# Patient Record
Sex: FEMALE | NOT HISPANIC OR LATINO | Employment: FULL TIME | URBAN - METROPOLITAN AREA
[De-identification: names, ages, dates, MRNs, and addresses within clinical notes are randomized per-mention and may not be internally consistent; named-entity substitution may affect disease eponyms.]

---

## 2023-11-06 DIAGNOSIS — G43.009 MIGRAINE WITHOUT AURA AND WITHOUT STATUS MIGRAINOSUS, NOT INTRACTABLE: ICD-10-CM

## 2023-11-06 PROBLEM — M79.7 FIBROMYALGIA: Status: ACTIVE | Noted: 2023-11-06

## 2023-11-06 PROBLEM — G43.909 MIGRAINES: Status: ACTIVE | Noted: 2023-11-06

## 2023-11-06 PROBLEM — R25.2 MUSCLE CRAMPS: Status: ACTIVE | Noted: 2023-11-06

## 2023-11-06 PROBLEM — E78.5 HYPERLIPIDEMIA: Status: ACTIVE | Noted: 2023-11-06

## 2023-11-06 PROBLEM — F98.8 ADD (ATTENTION DEFICIT DISORDER): Status: ACTIVE | Noted: 2023-11-06

## 2023-11-06 PROBLEM — R53.83 FATIGUE: Status: ACTIVE | Noted: 2023-11-06

## 2023-11-06 PROBLEM — M25.50 ARTHRALGIA: Status: ACTIVE | Noted: 2023-11-06

## 2023-11-06 PROBLEM — E55.9 VITAMIN D DEFICIENCY: Status: ACTIVE | Noted: 2023-11-06

## 2023-11-06 PROBLEM — R76.8 ANA POSITIVE: Status: ACTIVE | Noted: 2023-11-06

## 2023-11-06 PROBLEM — K62.89 RECTAL PAIN: Status: ACTIVE | Noted: 2023-11-06

## 2023-11-06 PROBLEM — R55 SYNCOPE: Status: ACTIVE | Noted: 2023-11-06

## 2023-11-06 RX ORDER — UBROGEPANT 100 MG/1
100 TABLET ORAL AS NEEDED
COMMUNITY
Start: 2022-11-04 | End: 2023-11-06 | Stop reason: SDUPTHER

## 2023-11-06 RX ORDER — BIOTIN 1 MG
1000 TABLET ORAL DAILY
COMMUNITY

## 2023-11-06 RX ORDER — UBROGEPANT 100 MG/1
TABLET ORAL
Qty: 10 TABLET | Refills: 11 | Status: SHIPPED | OUTPATIENT
Start: 2023-11-06

## 2023-11-06 RX ORDER — CHOLECALCIFEROL (VITAMIN D3) 25 MCG
1000 TABLET ORAL EVERY OTHER DAY
COMMUNITY

## 2023-11-06 RX ORDER — SUMATRIPTAN SUCCINATE 100 MG/1
100 TABLET, FILM COATED ORAL
COMMUNITY
Start: 2020-10-20 | End: 2023-11-28 | Stop reason: SDUPTHER

## 2023-11-28 DIAGNOSIS — G43.809 OTHER MIGRAINE WITHOUT STATUS MIGRAINOSUS, NOT INTRACTABLE: ICD-10-CM

## 2023-11-28 RX ORDER — SUMATRIPTAN SUCCINATE 100 MG/1
100 TABLET ORAL ONCE AS NEEDED
Qty: 30 TABLET | Refills: 3 | Status: SHIPPED | OUTPATIENT
Start: 2023-11-28 | End: 2023-11-28 | Stop reason: SDUPTHER

## 2023-11-28 RX ORDER — SUMATRIPTAN SUCCINATE 100 MG/1
TABLET ORAL
Qty: 30 TABLET | Refills: 3 | Status: SHIPPED | OUTPATIENT
Start: 2023-11-28

## 2024-02-13 ENCOUNTER — OFFICE VISIT (OUTPATIENT)
Dept: VASCULAR SURGERY | Facility: CLINIC | Age: 59
End: 2024-02-13
Payer: COMMERCIAL

## 2024-02-13 VITALS
SYSTOLIC BLOOD PRESSURE: 124 MMHG | DIASTOLIC BLOOD PRESSURE: 70 MMHG | WEIGHT: 152 LBS | OXYGEN SATURATION: 98 % | BODY MASS INDEX: 25.33 KG/M2 | HEART RATE: 86 BPM | HEIGHT: 65 IN

## 2024-02-13 DIAGNOSIS — I83.12 VARICOSE VEINS OF BOTH LOWER EXTREMITIES WITH INFLAMMATION: Primary | ICD-10-CM

## 2024-02-13 DIAGNOSIS — I83.11 VARICOSE VEINS OF BOTH LOWER EXTREMITIES WITH INFLAMMATION: Primary | ICD-10-CM

## 2024-02-13 DIAGNOSIS — M79.89 LEG SWELLING: ICD-10-CM

## 2024-02-13 DIAGNOSIS — I87.2 CHRONIC VENOUS INSUFFICIENCY OF LOWER EXTREMITY: ICD-10-CM

## 2024-02-13 PROCEDURE — 99203 OFFICE O/P NEW LOW 30 MIN: CPT | Performed by: NURSE PRACTITIONER

## 2024-02-13 PROCEDURE — 1036F TOBACCO NON-USER: CPT | Performed by: NURSE PRACTITIONER

## 2024-02-13 NOTE — PROGRESS NOTES
Daria Santoro is a 58 y.o. female     Subjective   This is a 58-year-old female new patient to this office who comes for evaluation and treatment of her venous disease.  Patient has prominent bilateral lower extremity varicose veins which started when she was in her 20s.  Her father and mother both had significant varicose veins and venous disease.  Her symptoms are her varicose veins become very swollen, painful, and hot, as the day progresses.  Patient is a non-smoker.  She is not a diabetic.  Patient denies fevers chills chest pain she does have significant left knee pain she is planning to have left knee surgery.  Patient is 5 feet 5 inches tall weighs 152 pounds her BMI is 25.3.    PMHx and PSHx HLD, syncope, osteoarthritis, fibromyalgia, EGD 2013, hysterectomy 2012, severe migraines, possible Raynaud's, fatigue,         Review of Systems A 10 point ROS was performed with the patient denying any complaint at this time aside from those listed in the HPI above.  OBJECTIVE    Vitals:    02/13/24 0700   BP: 124/70   Pulse: 86   SpO2: 98%      Physical Exam  Constitutional: Well developed , awake/alert/oriented x3, in no distress,  Eyes: Clear sclera  ENMT: mucous membranes are moist, no apparent injury, no lesions seen,   Head/neck: Neck supple, trachea  is midline, no apparent injury, no bruits, no mass, no stridor  Respiratory/thorax: Breath sounds clear and equal bilaterally with good chest expansion, thorax symmetric  Cardiac/Vascular: Regular, rate and rhythm, no murmurs, 2+ radial pulses, palpable bilateral popliteals, PTs and DPs  Gastrointestinal: Nondistended soft nontender, positive bowel sounds, no bruits.  Musculoskeletal: Moves all extremities, limited range of motion , no joint swelling,   Extremities: Very prominent bilateral lower extremity varicose veins, reticular veins and telangiectasia no cyanosis, no contusions or wounds,   Neurological: Alert and oriented x3,   Lymphatic: No significant  "lymphadenopathy  Skin: Warm and dry, no lesions, no rashes  Psychological: Appropriate mood and behavior    Current Outpatient Medications:     biotin 1 mg tablet, Take 1 tablet (1,000 mcg) by mouth once daily., Disp: , Rfl:     cholecalciferol (Vitamin D-3) 25 MCG (1000 UT) tablet, Take 1 tablet (1,000 Units) by mouth every other day., Disp: , Rfl:     SUMAtriptan (Imitrex) 100 mg tablet, TAKE ONE TABLET AT ONSET OF HEADACHE.  MAY REPEAT IN 2 HOURS IF NEEDED, Disp: 30 tablet, Rfl: 3    Ubrelvy 100 mg tablet tablet, TAKE ONE TABLET AT ONSET OF HEADACHE.  MAY REPEAT IN 2 HOURS IN NEEDED.  MAXIMUM 2 TABLETS PER DAY, Disp: 10 tablet, Rfl: 11     No results found for: \"WBC\", \"HGB\", \"HCT\", \"PLT\", \"CHOL\", \"TRIG\", \"HDL\", \"LDLDIRECT\", \"ALT\", \"AST\", \"NA\", \"K\", \"CL\", \"CREATININE\", \"BUN\", \"CO2\", \"TSH\", \"PSA\", \"INR\", \"GLUF\", \"HGBA1C\", \"ALBUR\", \"BLOODCULT\"    Patient was never admitted.     Assessment/Plan   Problem List Items Addressed This Visit       Varicose veins of both lower extremities with inflammation - Primary    Chronic venous insufficiency of lower extremity     Other Visit Diagnoses       Leg swelling        Relevant Orders    Vascular US lower extremity venous insufficiency bilateral        This is a 58-year-old female who has prominent varicose vein with pain.  She is planning to have knee surgery sometime this year of the left lower extremity.  Her varicose veins are very painful.  Patient has seen a vascular surgeon proximately 7 years ago at Dr. Merino. After examination patient and practitioner jointly discussed extensively factors that increase or exacerbate venous disease and actions to control, treat, and mitigate symptoms in this disease process: This included weight reduction, decreasing carbohydrate and simple sugar intake in diet, starting an exercise regiment which may include walking, using stationary bike or pedal device on a daily basis. Patient is encouraged to apply compression stockings when the " client first gets out of bed in the morning and to wear them daily. Elevations of legs above the heart needs to be performed  several times throughout the day, as well as to minimize sitting or standing for long periods of time. If the patient has  dry skin or stasis dermatitis of the lower extremity they are to use lotions, and to be compliant with cardiovascular medications as ordered.  Send informed she must wear compression stockings on a regular basis for the next 90 days.    Patient is to follow-up with Dr. Reyna in 3 to 4 months, venous duplex with reflux ordered, prescription for compression stockings given to patient.    Thank you very much for allowing Vascular Surgery to be involved in the care of your patient sincerely Jacques MELTON-CNP .  (This note was generated with voice recognition software and may contain errors including spelling, grammar, syntax and missed recognition of what was dictated, of which may not have been fully corrected)  Jacques Nicole, APRN-CNP

## 2024-02-29 ENCOUNTER — TELEPHONE (OUTPATIENT)
Dept: PRIMARY CARE | Facility: CLINIC | Age: 59
End: 2024-02-29
Payer: COMMERCIAL

## 2024-02-29 NOTE — TELEPHONE ENCOUNTER
----- Message from Alison Hilliard MD sent at 2/29/2024  1:00 PM EST -----  Please let patient know mammogram is normal.

## 2024-03-06 ENCOUNTER — TELEPHONE (OUTPATIENT)
Dept: PRIMARY CARE | Facility: CLINIC | Age: 59
End: 2024-03-06
Payer: COMMERCIAL

## 2024-03-06 NOTE — TELEPHONE ENCOUNTER
----- Message from Alison Hilliard MD sent at 3/3/2024  7:17 PM EST -----  Please let patient know mammogram is normal.

## 2024-06-06 DIAGNOSIS — G43.009 MIGRAINE WITHOUT AURA AND WITHOUT STATUS MIGRAINOSUS, NOT INTRACTABLE: ICD-10-CM

## 2024-06-06 RX ORDER — ONDANSETRON HYDROCHLORIDE 8 MG/1
8 TABLET, FILM COATED ORAL 3 TIMES DAILY PRN
COMMUNITY
Start: 2023-09-26 | End: 2024-06-06 | Stop reason: SDUPTHER

## 2024-06-06 RX ORDER — ONDANSETRON HYDROCHLORIDE 8 MG/1
8 TABLET, FILM COATED ORAL 3 TIMES DAILY PRN
Qty: 20 TABLET | Refills: 0 | Status: SHIPPED | OUTPATIENT
Start: 2024-06-06

## 2024-06-12 ENCOUNTER — APPOINTMENT (OUTPATIENT)
Dept: VASCULAR SURGERY | Facility: CLINIC | Age: 59
End: 2024-06-12
Payer: COMMERCIAL

## 2024-12-03 DIAGNOSIS — G43.809 OTHER MIGRAINE WITHOUT STATUS MIGRAINOSUS, NOT INTRACTABLE: ICD-10-CM

## 2024-12-03 RX ORDER — SUMATRIPTAN SUCCINATE 100 MG/1
TABLET ORAL
Qty: 30 TABLET | Refills: 3 | Status: SHIPPED | OUTPATIENT
Start: 2024-12-03

## 2024-12-11 ENCOUNTER — APPOINTMENT (OUTPATIENT)
Dept: RADIOLOGY | Facility: HOSPITAL | Age: 59
End: 2024-12-11
Payer: COMMERCIAL

## 2024-12-11 ENCOUNTER — HOSPITAL ENCOUNTER (EMERGENCY)
Facility: HOSPITAL | Age: 59
Discharge: HOME | End: 2024-12-12
Attending: STUDENT IN AN ORGANIZED HEALTH CARE EDUCATION/TRAINING PROGRAM
Payer: COMMERCIAL

## 2024-12-11 VITALS
TEMPERATURE: 99 F | OXYGEN SATURATION: 98 % | HEIGHT: 65 IN | HEART RATE: 68 BPM | SYSTOLIC BLOOD PRESSURE: 122 MMHG | BODY MASS INDEX: 25.33 KG/M2 | WEIGHT: 152 LBS | DIASTOLIC BLOOD PRESSURE: 77 MMHG | RESPIRATION RATE: 18 BRPM

## 2024-12-11 DIAGNOSIS — G43.809 OTHER MIGRAINE WITHOUT STATUS MIGRAINOSUS, NOT INTRACTABLE: Primary | ICD-10-CM

## 2024-12-11 LAB
ALBUMIN SERPL BCP-MCNC: 4.3 G/DL (ref 3.4–5)
ALP SERPL-CCNC: 57 U/L (ref 33–136)
ALT SERPL W P-5'-P-CCNC: 13 U/L (ref 7–45)
ANION GAP SERPL CALC-SCNC: 15 MMOL/L (ref 10–20)
AST SERPL W P-5'-P-CCNC: 17 U/L (ref 9–39)
BASOPHILS # BLD AUTO: 0.02 X10*3/UL (ref 0–0.1)
BASOPHILS NFR BLD AUTO: 0.3 %
BILIRUB SERPL-MCNC: 0.5 MG/DL (ref 0–1.2)
BUN SERPL-MCNC: 14 MG/DL (ref 6–23)
CALCIUM SERPL-MCNC: 8.9 MG/DL (ref 8.6–10.3)
CHLORIDE SERPL-SCNC: 106 MMOL/L (ref 98–107)
CO2 SERPL-SCNC: 23 MMOL/L (ref 21–32)
CREAT SERPL-MCNC: 0.7 MG/DL (ref 0.5–1.05)
EGFRCR SERPLBLD CKD-EPI 2021: >90 ML/MIN/1.73M*2
EOSINOPHIL # BLD AUTO: 0.01 X10*3/UL (ref 0–0.7)
EOSINOPHIL NFR BLD AUTO: 0.1 %
ERYTHROCYTE [DISTWIDTH] IN BLOOD BY AUTOMATED COUNT: 13.2 % (ref 11.5–14.5)
GLUCOSE SERPL-MCNC: 108 MG/DL (ref 74–99)
HCT VFR BLD AUTO: 46.3 % (ref 36–46)
HGB BLD-MCNC: 14.7 G/DL (ref 12–16)
IMM GRANULOCYTES # BLD AUTO: 0.01 X10*3/UL (ref 0–0.7)
IMM GRANULOCYTES NFR BLD AUTO: 0.1 % (ref 0–0.9)
LYMPHOCYTES # BLD AUTO: 0.52 X10*3/UL (ref 1.2–4.8)
LYMPHOCYTES NFR BLD AUTO: 7.2 %
MCH RBC QN AUTO: 29.8 PG (ref 26–34)
MCHC RBC AUTO-ENTMCNC: 31.7 G/DL (ref 32–36)
MCV RBC AUTO: 94 FL (ref 80–100)
MONOCYTES # BLD AUTO: 0.27 X10*3/UL (ref 0.1–1)
MONOCYTES NFR BLD AUTO: 3.7 %
NEUTROPHILS # BLD AUTO: 6.39 X10*3/UL (ref 1.2–7.7)
NEUTROPHILS NFR BLD AUTO: 88.6 %
NRBC BLD-RTO: 0 /100 WBCS (ref 0–0)
PLATELET # BLD AUTO: 215 X10*3/UL (ref 150–450)
POTASSIUM SERPL-SCNC: 3.5 MMOL/L (ref 3.5–5.3)
PROT SERPL-MCNC: 6.7 G/DL (ref 6.4–8.2)
RBC # BLD AUTO: 4.93 X10*6/UL (ref 4–5.2)
SODIUM SERPL-SCNC: 140 MMOL/L (ref 136–145)
WBC # BLD AUTO: 7.2 X10*3/UL (ref 4.4–11.3)

## 2024-12-11 PROCEDURE — 2500000004 HC RX 250 GENERAL PHARMACY W/ HCPCS (ALT 636 FOR OP/ED)

## 2024-12-11 PROCEDURE — 2550000001 HC RX 255 CONTRASTS: Performed by: STUDENT IN AN ORGANIZED HEALTH CARE EDUCATION/TRAINING PROGRAM

## 2024-12-11 PROCEDURE — 85025 COMPLETE CBC W/AUTO DIFF WBC: CPT

## 2024-12-11 PROCEDURE — 70496 CT ANGIOGRAPHY HEAD: CPT | Performed by: RADIOLOGY

## 2024-12-11 PROCEDURE — 80053 COMPREHEN METABOLIC PANEL: CPT

## 2024-12-11 PROCEDURE — 70498 CT ANGIOGRAPHY NECK: CPT

## 2024-12-11 PROCEDURE — 70450 CT HEAD/BRAIN W/O DYE: CPT | Performed by: RADIOLOGY

## 2024-12-11 PROCEDURE — 70450 CT HEAD/BRAIN W/O DYE: CPT

## 2024-12-11 PROCEDURE — 99285 EMERGENCY DEPT VISIT HI MDM: CPT | Mod: 25 | Performed by: STUDENT IN AN ORGANIZED HEALTH CARE EDUCATION/TRAINING PROGRAM

## 2024-12-11 PROCEDURE — 96375 TX/PRO/DX INJ NEW DRUG ADDON: CPT | Mod: 59

## 2024-12-11 PROCEDURE — 36415 COLL VENOUS BLD VENIPUNCTURE: CPT

## 2024-12-11 PROCEDURE — 99285 EMERGENCY DEPT VISIT HI MDM: CPT | Performed by: STUDENT IN AN ORGANIZED HEALTH CARE EDUCATION/TRAINING PROGRAM

## 2024-12-11 PROCEDURE — 96366 THER/PROPH/DIAG IV INF ADDON: CPT

## 2024-12-11 PROCEDURE — 2500000004 HC RX 250 GENERAL PHARMACY W/ HCPCS (ALT 636 FOR OP/ED): Performed by: STUDENT IN AN ORGANIZED HEALTH CARE EDUCATION/TRAINING PROGRAM

## 2024-12-11 PROCEDURE — 96365 THER/PROPH/DIAG IV INF INIT: CPT | Mod: 59

## 2024-12-11 PROCEDURE — 96367 TX/PROPH/DG ADDL SEQ IV INF: CPT

## 2024-12-11 PROCEDURE — 2500000005 HC RX 250 GENERAL PHARMACY W/O HCPCS: Performed by: STUDENT IN AN ORGANIZED HEALTH CARE EDUCATION/TRAINING PROGRAM

## 2024-12-11 PROCEDURE — 70498 CT ANGIOGRAPHY NECK: CPT | Performed by: RADIOLOGY

## 2024-12-11 RX ORDER — METOCLOPRAMIDE HYDROCHLORIDE 5 MG/ML
10 INJECTION INTRAMUSCULAR; INTRAVENOUS ONCE
Status: COMPLETED | OUTPATIENT
Start: 2024-12-11 | End: 2024-12-11

## 2024-12-11 RX ORDER — ACETAMINOPHEN 325 MG/1
975 TABLET ORAL ONCE
Status: DISCONTINUED | OUTPATIENT
Start: 2024-12-11 | End: 2024-12-12 | Stop reason: HOSPADM

## 2024-12-11 RX ORDER — ACETAMINOPHEN 325 MG/1
650 TABLET ORAL ONCE
Status: DISCONTINUED | OUTPATIENT
Start: 2024-12-11 | End: 2024-12-11

## 2024-12-11 RX ORDER — DIPHENHYDRAMINE HYDROCHLORIDE 50 MG/ML
12.5 INJECTION INTRAMUSCULAR; INTRAVENOUS ONCE
Status: COMPLETED | OUTPATIENT
Start: 2024-12-11 | End: 2024-12-11

## 2024-12-11 RX ORDER — MAGNESIUM SULFATE HEPTAHYDRATE 40 MG/ML
2 INJECTION, SOLUTION INTRAVENOUS ONCE
Status: COMPLETED | OUTPATIENT
Start: 2024-12-11 | End: 2024-12-11

## 2024-12-11 ASSESSMENT — PAIN - FUNCTIONAL ASSESSMENT
PAIN_FUNCTIONAL_ASSESSMENT: 0-10
PAIN_FUNCTIONAL_ASSESSMENT: 0-10

## 2024-12-11 ASSESSMENT — PAIN SCALES - GENERAL
PAINLEVEL_OUTOF10: 4
PAINLEVEL_OUTOF10: 8

## 2024-12-11 ASSESSMENT — PAIN DESCRIPTION - PROGRESSION: CLINICAL_PROGRESSION: NOT CHANGED

## 2024-12-11 ASSESSMENT — PAIN DESCRIPTION - FREQUENCY: FREQUENCY: CONSTANT/CONTINUOUS

## 2024-12-11 ASSESSMENT — LIFESTYLE VARIABLES
HAVE PEOPLE ANNOYED YOU BY CRITICIZING YOUR DRINKING: NO
HAVE YOU EVER FELT YOU SHOULD CUT DOWN ON YOUR DRINKING: NO
EVER FELT BAD OR GUILTY ABOUT YOUR DRINKING: NO
EVER HAD A DRINK FIRST THING IN THE MORNING TO STEADY YOUR NERVES TO GET RID OF A HANGOVER: NO
TOTAL SCORE: 0

## 2024-12-11 ASSESSMENT — PAIN DESCRIPTION - PAIN TYPE
TYPE: ACUTE PAIN
TYPE: ACUTE PAIN

## 2024-12-11 ASSESSMENT — PAIN DESCRIPTION - ORIENTATION: ORIENTATION: RIGHT;LEFT;ANTERIOR

## 2024-12-11 ASSESSMENT — COLUMBIA-SUICIDE SEVERITY RATING SCALE - C-SSRS
6. HAVE YOU EVER DONE ANYTHING, STARTED TO DO ANYTHING, OR PREPARED TO DO ANYTHING TO END YOUR LIFE?: NO
1. IN THE PAST MONTH, HAVE YOU WISHED YOU WERE DEAD OR WISHED YOU COULD GO TO SLEEP AND NOT WAKE UP?: NO
2. HAVE YOU ACTUALLY HAD ANY THOUGHTS OF KILLING YOURSELF?: NO

## 2024-12-11 ASSESSMENT — PAIN DESCRIPTION - ONSET: ONSET: ONGOING

## 2024-12-11 ASSESSMENT — PAIN DESCRIPTION - DESCRIPTORS
DESCRIPTORS: PRESSURE
DESCRIPTORS: ACHING

## 2024-12-11 ASSESSMENT — PAIN DESCRIPTION - LOCATION
LOCATION: HEAD
LOCATION: HEAD

## 2024-12-11 NOTE — ED PROVIDER NOTES
"EMERGENCY DEPARTMENT ENCOUNTER      Pt Name: Daria Santoro  MRN: [id number]  Birthdate 1965  Date of evaluation: 12/11/2024  Provider: Chato Crawford DO    CHIEF COMPLAINT       Chief Complaint   Patient presents with    migraine         HISTORY OF PRESENT ILLNESS    59-year-old female, history of \"migraines\" comes emergency room today with a frontal, constant headache.  He also has a headache at the base of her neck near her occipital bone.  He said that she has been having significant neck and shoulder pain recently and has been causing a lot of pain at the back of her head but she has been getting more frequent headaches especially in the front as well.  She took a sumatriptan at 545, threw it up and also try to take another half a tablet at noon but threw that up as well.  She has had 5 episodes of vomiting today.  Has photophobia as well.  No vision changes, gets migraines without aura.  Has seen a neurologist but does not appear to have any head imaging.  No chest pain, shortness of breath, no other symptoms.  There is a family history of hemorrhagic stroke, unclear if it was secondary to a brain aneurysm.      History provided by:  Patient      Nursing Notes were reviewed.    PAST MEDICAL HISTORY     Past Medical History:   Diagnosis Date    Personal history of other diseases of the nervous system and sense organs 05/21/2019    History of migraine         SURGICAL HISTORY       Past Surgical History:   Procedure Laterality Date    OTHER SURGICAL HISTORY  05/21/2019    Hysterectomy vaginal    OTHER SURGICAL HISTORY  02/10/2022    Colonoscopy         CURRENT MEDICATIONS       Previous Medications    BIOTIN 1 MG TABLET    Take 1 tablet (1,000 mcg) by mouth once daily.    CHOLECALCIFEROL (VITAMIN D-3) 25 MCG (1000 UT) TABLET    Take 1 tablet (1,000 Units) by mouth every other day.    ONDANSETRON (ZOFRAN) 8 MG TABLET    Take 1 tablet (8 mg) by mouth 3 times a day as needed for nausea.    SUMATRIPTAN (IMITREX) " 100 MG TABLET    TAKE ONE TABLET AT ONSET OF HEADACHE. MAY REPEAT IN 2 HOURS IF NEEDED    UBRELVY 100 MG TABLET TABLET    TAKE ONE TABLET AT ONSET OF HEADACHE.  MAY REPEAT IN 2 HOURS IN NEEDED.  MAXIMUM 2 TABLETS PER DAY       ALLERGIES     Sulfamethoxazole-trimethoprim    FAMILY HISTORY     No family history on file.       SOCIAL HISTORY       Social History     Socioeconomic History    Marital status:    Tobacco Use    Smoking status: Never    Smokeless tobacco: Never       SCREENINGS                        PHYSICAL EXAM    (up to 7 for level 4, 8 or more for level 5)     ED Triage Vitals [12/11/24 1716]   Temperature Heart Rate Respirations BP   37.2 °C (99 °F) 73 18 148/79      Pulse Ox Temp Source Heart Rate Source Patient Position   100 % Temporal Monitor Sitting      BP Location FiO2 (%)     Right arm --       Physical Exam  Vitals and nursing note reviewed.   Constitutional:       General: She is not in acute distress.  HENT:      Head: Normocephalic and atraumatic.   Eyes:      General: No scleral icterus.        Right eye: No discharge.         Left eye: No discharge.      Extraocular Movements: Extraocular movements intact.      Conjunctiva/sclera: Conjunctivae normal.      Pupils: Pupils are equal, round, and reactive to light.   Cardiovascular:      Rate and Rhythm: Normal rate and regular rhythm.      Pulses: Normal pulses.   Pulmonary:      Effort: Pulmonary effort is normal.   Abdominal:      General: Abdomen is flat.      Palpations: Abdomen is soft.      Tenderness: There is no abdominal tenderness. There is no guarding or rebound.   Musculoskeletal:         General: No deformity.      Right lower leg: No edema.      Left lower leg: No edema.   Skin:     General: Skin is warm and dry.   Neurological:      Mental Status: She is alert and oriented to person, place, and time. Mental status is at baseline.      Comments: CN II-XII intact, strength 5/5, sensation intact throughout.  No dysmetria    Psychiatric:         Mood and Affect: Mood normal.         Behavior: Behavior normal.     Normal gait, normal sensation, normal reflexes    DIAGNOSTIC RESULTS     LABS:  Labs Reviewed   CBC WITH AUTO DIFFERENTIAL - Abnormal       Result Value    WBC 7.2      nRBC 0.0      RBC 4.93      Hemoglobin 14.7      Hematocrit 46.3 (*)     MCV 94      MCH 29.8      MCHC 31.7 (*)     RDW 13.2      Platelets 215      Neutrophils % 88.6      Immature Granulocytes %, Automated 0.1      Lymphocytes % 7.2      Monocytes % 3.7      Eosinophils % 0.1      Basophils % 0.3      Neutrophils Absolute 6.39      Immature Granulocytes Absolute, Automated 0.01      Lymphocytes Absolute 0.52 (*)     Monocytes Absolute 0.27      Eosinophils Absolute 0.01      Basophils Absolute 0.02     COMPREHENSIVE METABOLIC PANEL - Abnormal    Glucose 108 (*)     Sodium 140      Potassium 3.5      Chloride 106      Bicarbonate 23      Anion Gap 15      Urea Nitrogen 14      Creatinine 0.70      eGFR >90      Calcium 8.9      Albumin 4.3      Alkaline Phosphatase 57      Total Protein 6.7      AST 17      Bilirubin, Total 0.5      ALT 13         All other labs were within normal range or not returned as of this dictation.    Imaging  CT head wo IV contrast    (Results Pending)   CT angio head and neck w and wo IV contrast    (Results Pending)        Procedures  Procedures     EMERGENCY DEPARTMENT COURSE/MDM:     ED Course as of 12/11/24 2334   Wed Dec 11, 2024   2134 Reevaluated, patient still has headache [JH]   2319 Patient signed out to me at 10 PM  On reassessment patient's headache is better compared to earlier today.  She reports she woke up with a headache around 5 AM when the headache was around 4 out of 10 in intensity.  Became more and more severe gradually over time until reaching peak intensity around 3 PM or 4 PM.  She was unable to take her migraine medications due to nausea.  Since coming to the ED and receiving medications she is starting  to feel better although does have pain 3.5 out of 10.  She does not feel as nauseous anymore.  Requested diet ginger ale and water for p.o. challenge.  Will reassess once patient has p.o. [FN]      ED Course User Index  [FN] Antonieta Brunner MD  [JH] Juan Carlos Currie MD         Diagnoses as of 12/11/24 3614   Other migraine without status migrainosus, not intractable        Medical Decision Making  59-year-old female comes emergency room with a headache.  Did order CT head, CT angio head and neck to assess for possible intracranial hemorrhage, brain aneurysm.,  Mass effect.  To get basic blood work as well given repeated episodes of vomiting.    Independent review of labs, CBC shows normal white count, hemoglobin and platelets, chemistry shows normal electrolytes, kidney and hepatic function.  Patient received multiple headache medications, 12.5 mg IV Benadryl, 10 mg IV Reglan, 1 L IV normal saline bolus, 10 mg IV Decadron, 2 g IV magnesium, 500 mg IV Depacon.    Patient on reassessment had 4 out of 10 headache, did look much more comfortable.  She was signed out to the night attending, Dr. Brunner, CT angio head and neck, CT head without contrast and disposition.    Ultimately, patient did pass p.o. challenge, improved symptoms, and discharge.  Ambulating appropriately.    Patient and or family in agreement and understanding of treatment plan.  All questions answered.      I reviewed the case with the attending ED physician. The attending ED physician agrees with the plan. Patient and/or patient´s representative was counseled regarding labs, imaging, likely diagnosis, and plan. All questions were answered.    ED Medications administered this visit:    Medications   acetaminophen (Tylenol) tablet 975 mg (975 mg oral Not Given 12/11/24 2319)   diphenhydrAMINE (BENADryl) injection 12.5 mg (12.5 mg intravenous Given 12/11/24 1814)   metoclopramide (Reglan) injection 10 mg (10 mg intravenous Given 12/11/24 1815)   sodium  chloride 0.9 % bolus 1,000 mL (0 mL intravenous Stopped 12/11/24 1841)   dexAMETHasone (Decadron) injection 10 mg (10 mg intravenous Given 12/11/24 1923)   magnesium sulfate 2 g in sterile water for injection 50 mL (0 g intravenous Stopped 12/11/24 1945)   valproate (Depacon) 500 mg in dextrose 5% 50 mL IV (500 mg intravenous New Bag 12/11/24 2151)   iohexol (OMNIPaque) 350 mg iodine/mL solution 70 mL (70 mL intravenous Given 12/11/24 2205)       Final Impression:   1. Other migraine without status migrainosus, not intractable          (Please note that portions of this note were completed with a voice recognition program.  Efforts were made to edit the dictations but occasionally words are mis-transcribed.)     Chato Crawford DO  Resident  12/11/24 8162       Juan Carlos Currie MD  12/14/24 8578

## 2024-12-11 NOTE — ED TRIAGE NOTES
Pt to ED with migraine front of head and base of neck, has hx of migraines with no aura. Takes sumatriptan 100mg, states she took 1 1/2 of these with no relief.

## 2024-12-12 NOTE — DISCHARGE INSTRUCTIONS
You have been evaluated in the Emergency Department today for:  1. Other migraine without status migrainosus, not intractable            Please follow up with your primary care physician within 1 week and discuss the results of your Emergency Department evaluation with them.    Return to the Emergency Department if you experience any worsening of your symptoms, inability to eat or drink, worsening or new symptoms (difficulty breathing, chest pain, or fever). Please also return if you are not feeling better or have had difficulty following up with an outpatient doctor within one week. You may call of visit the Emergency Department at any time.    Thank you for choosing us for your care.

## 2024-12-24 ENCOUNTER — APPOINTMENT (OUTPATIENT)
Dept: PRIMARY CARE | Facility: CLINIC | Age: 59
End: 2024-12-24
Payer: COMMERCIAL